# Patient Record
Sex: MALE | Race: WHITE | Employment: STUDENT
[De-identification: names, ages, dates, MRNs, and addresses within clinical notes are randomized per-mention and may not be internally consistent; named-entity substitution may affect disease eponyms.]

---
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
## 2021-01-04 ENCOUNTER — OFFICE VISIT (OUTPATIENT)
Dept: DERMATOLOGY | Age: 21
End: 2021-01-04
Payer: COMMERCIAL

## 2021-01-04 VITALS
SYSTOLIC BLOOD PRESSURE: 106 MMHG | BODY MASS INDEX: 23.14 KG/M2 | HEIGHT: 77 IN | DIASTOLIC BLOOD PRESSURE: 71 MMHG | TEMPERATURE: 98.2 F | HEART RATE: 87 BPM | OXYGEN SATURATION: 96 % | WEIGHT: 196 LBS

## 2021-01-04 DIAGNOSIS — L70.0 ACNE VULGARIS: Primary | ICD-10-CM

## 2021-01-04 DIAGNOSIS — L73.0 POST-ACNE SCARRING: ICD-10-CM

## 2021-01-04 PROCEDURE — 99213 OFFICE O/P EST LOW 20 MIN: CPT | Performed by: DERMATOLOGY

## 2021-01-04 RX ORDER — TRETINOIN 0.5 MG/G
CREAM TOPICAL
Qty: 45 G | Refills: 11 | Status: SHIPPED | OUTPATIENT
Start: 2021-01-04 | End: 2022-01-03 | Stop reason: SDUPTHER

## 2021-01-04 SDOH — HEALTH STABILITY: MENTAL HEALTH: HOW OFTEN DO YOU HAVE A DRINK CONTAINING ALCOHOL?: NEVER

## 2021-01-04 NOTE — PROGRESS NOTES
Dermatology Patient Note  [address]  [address]  [address] [place] [id number]  Dept: [phone]  Dept Fax: [phone]      VISITDATE: 1/4/2021   REFERRING PROVIDER: No ref. provider found      Carlton Bentley is a 24 y.o. male  who presents today in the office for:    Acne (Follow up: pt is off Accutane, he states that he is doing well. He will only use the tretinoin if needed. )      PERTINENT HISTORY NOT LISTED ABOVE:  Patient still has scarring but not very bothered by it currently  Will occasionally get new bumps if he goes a few days without washing face  Only using tretinoin as needed    CURRENT MEDICATIONS:   Current Outpatient Medications   Medication Sig Dispense Refill    tretinoin (RETIN-A) 0.05 % cream Apply a pea sized amount to face, chest and back nightly 45 g 11    albuterol sulfate HFA (VENTOLIN HFA) 108 (90 Base) MCG/ACT inhaler Inhale 2 puffs into the lungs every 4 hours as needed for Shortness of Breath 1 Inhaler 1     No current facility-administered medications for this visit. ALLERGIES:   No Known Allergies    SOCIAL HISTORY:  Social History     Tobacco Use    Smoking status: Never Smoker    Smokeless tobacco: Never Used   Substance Use Topics    Alcohol use: Never     Frequency: Never     Binge frequency: Never       Pertinent ROS:  Review of Systems  Skin: Denies any new changing, growing or bleeding lesions or rashes except as described in the HPI   Constitutional: Denies fevers, chills, and malaise.     PHYSICAL EXAM:   /71 (Site: Left Upper Arm, Position: Sitting, Cuff Size: Medium Adult)   Pulse 87   Temp 98.2 °F (36.8 °C) (Temporal)   Ht (!) 6' 5\" (1.956 m)   Wt 196 lb (88.9 kg)   SpO2 96%   BMI 23.24 kg/m²     The patient is generally well appearing, well nourished, alert and conversational. Affect is normal.    Cutaneous Exam:  Acne exam, which includes the head/face, neck, chest, and back was performed    Diagnoses/exam findings/medical history pertinent to this visit are listed below:    Assessment and Plan:  Assessment   1. Acne vulgaris  - stable chronic illness  - no active lesions today, but reports that he has had slight recurrences  - discussed using tretinoin daily as preventative for new acne bumps  - tretinoin (RETIN-A) 0.05 % cream; Apply a pea sized amount to face, chest and back nightly  Dispense: 45 g; Refill: 11    2. Post-acne scarring  - discussed referral to cosmetic therapy including CO2 laser, may be interested in the future but not currently            RTC 1 year    Patient Instructions   - Advise us if you'd like referral for laser therapy  - Continue topicals  - Follow up in 1 year      This note was created with the assistance of a speech-recognition program.  Although the intention is to generate a document that actually reflects the content of the visit, no guarantees can be provided that every mistake has been identified and corrected byediting.     Electronically signed by Jaron Frias MD on 1/4/21 at 11:21 AM EST

## 2021-03-05 ENCOUNTER — OFFICE VISIT (OUTPATIENT)
Dept: PEDIATRICS CLINIC | Age: 21
End: 2021-03-05
Payer: COMMERCIAL

## 2021-03-05 VITALS
WEIGHT: 191 LBS | BODY MASS INDEX: 22.55 KG/M2 | TEMPERATURE: 98.4 F | HEIGHT: 77 IN | SYSTOLIC BLOOD PRESSURE: 132 MMHG | HEART RATE: 75 BPM | DIASTOLIC BLOOD PRESSURE: 72 MMHG

## 2021-03-05 DIAGNOSIS — L98.9 SKIN LESION OF SCALP: Primary | ICD-10-CM

## 2021-03-05 DIAGNOSIS — L21.9 SEBORRHEIC DERMATITIS: ICD-10-CM

## 2021-03-05 PROCEDURE — 99213 OFFICE O/P EST LOW 20 MIN: CPT | Performed by: NURSE PRACTITIONER

## 2021-03-05 RX ORDER — KETOCONAZOLE 20 MG/ML
SHAMPOO TOPICAL
Qty: 1 BOTTLE | Refills: 2 | Status: SHIPPED | OUTPATIENT
Start: 2021-03-05

## 2021-03-05 ASSESSMENT — ENCOUNTER SYMPTOMS
COUGH: 0
SORE THROAT: 0
ABDOMINAL PAIN: 0
SWOLLEN GLANDS: 0

## 2021-03-05 NOTE — PROGRESS NOTES
Subjective:      Patient ID: Rosemary Joy is a 24 y.o. male who presents in office today for C/o soft spot on top of head. Patient states he noticed spot 2 weeks ago denies pain, headaches, activity change or appetite change. Chief Complaint   Patient presents with    Other       Onset: 2 weeks ago   Location: Top of head   Duration:   Characteristics: Soft   Associated symptoms: None   Relieving factors: None   Treatments: None     Other  This is a new problem. The current episode started more than 1 month ago. The problem occurs constantly. The problem has been unchanged. Pertinent negatives include no abdominal pain, anorexia, congestion, coughing, fatigue, fever, headaches, rash, sore throat, swollen glands or weakness. Nothing aggravates the symptoms. He has tried nothing for the symptoms. The treatment provided no relief. Review of Systems   Constitutional: Negative for activity change, appetite change, fatigue and fever. HENT: Negative for congestion and sore throat. Respiratory: Negative for cough. Gastrointestinal: Negative for abdominal pain and anorexia. Skin: Negative for rash. Neurological: Negative for dizziness, weakness and headaches. All other systems reviewed and are negative. Objective:   /72 (Site: Right Upper Arm, Position: Sitting, Cuff Size: Medium Adult)   Pulse 75   Temp 98.4 °F (36.9 °C) (Temporal)   Ht (!) 6' 5\" (1.956 m)   Wt 191 lb (86.6 kg)   BMI 22.65 kg/m²   Physical Exam  Vitals signs and nursing note reviewed. Constitutional:       General: He is not in acute distress. Appearance: Normal appearance. He is well-developed. HENT:      Head: Normocephalic. Eyes:      General:         Right eye: No discharge. Left eye: No discharge. Conjunctiva/sclera: Conjunctivae normal.   Neck:      Musculoskeletal: Neck supple. Thyroid: No thyromegaly. Cardiovascular:      Rate and Rhythm: Normal rate and regular rhythm. Heart sounds: Normal heart sounds. No murmur. Pulmonary:      Effort: Pulmonary effort is normal. No respiratory distress. Breath sounds: Normal breath sounds. No wheezing or rales. Lymphadenopathy:      Cervical: No cervical adenopathy. Skin:     General: Skin is warm and dry. Comments: Top of scalp hear crown of head with approximately [id number] palpable area that is soft, boggy. There is no change to the skin pigment of the overlying area, no obvious lesions except for seborrheic dermatitis that is noted    Neurological:      Mental Status: He is alert. Psychiatric:         Behavior: Behavior normal.           Assessment/Plan:           Diagnosis Orders   1. Skin lesion of scalp     2. Seborrheic dermatitis  ketoconazole (NIZORAL) 2 % shampoo       Lesion of scalp: Area of soft, boggy swelling to crown of head, noted months ago without change, unsure of etiology but doubt anything concerning. I would like for him to see dermatologist, he follows with Dr. Barney Golden for acne. He also has seborrheic dermatitis, so will treat with ketoconazole shampoo. Call with concerns    Results for orders placed or performed during the hospital encounter of 09/06/19   Hepatic Function Panel   Result Value Ref Range    Albumin 4.8 3.5 - 5.2 g/dL    Alkaline Phosphatase 111 40 - 129 U/L    ALT 11 5 - 41 U/L    AST 15 <40 U/L    Total Bilirubin 0.60 0.3 - 1.2 mg/dL    Bilirubin, Direct 0.12 <0.31 mg/dL    Bilirubin, Indirect 0.48 0.00 - 1.00 mg/dL    Total Protein 8.1 6.4 - 8.3 g/dL    Globulin NOT REPORTED 1.5 - 3.8 g/dL    Albumin/Globulin Ratio 1.5 1.0 - 2.5   Lipid Panel   Result Value Ref Range    Cholesterol 173 <200 mg/dL    HDL 37 (L) >40 mg/dL    LDL Cholesterol 122 0 - 130 mg/dL    Chol/HDL Ratio 4.7 <5    Triglycerides 71 <150 mg/dL    VLDL NOT REPORTED 1 - 30 mg/dL       Return if symptoms worsen or fail to improve.     I have reviewed and agree with documentation per clinical staff, and have made any necessaryadjustments.   Electronically signed by LUIS Perez CNP on 3/5/2021 at 10:34 AM Please note that portions of this note were completed with a voice recognition program. Efforts weremade to edit the dictations but occasionally words are mis-transcribed.)

## 2021-05-28 ENCOUNTER — TELEPHONE (OUTPATIENT)
Dept: PEDIATRICS CLINIC | Age: 21
End: 2021-05-28

## 2021-05-28 ENCOUNTER — TELEMEDICINE (OUTPATIENT)
Dept: PEDIATRICS CLINIC | Age: 21
End: 2021-05-28
Payer: COMMERCIAL

## 2021-05-28 ENCOUNTER — PATIENT MESSAGE (OUTPATIENT)
Dept: PEDIATRICS CLINIC | Age: 21
End: 2021-05-28

## 2021-05-28 DIAGNOSIS — L05.91 PILONIDAL CYST: Primary | ICD-10-CM

## 2021-05-28 PROCEDURE — 99213 OFFICE O/P EST LOW 20 MIN: CPT | Performed by: NURSE PRACTITIONER

## 2021-05-28 ASSESSMENT — ENCOUNTER SYMPTOMS
ABDOMINAL PAIN: 0
VOMITING: 0

## 2021-05-28 NOTE — PATIENT INSTRUCTIONS
[address] [address], [place], [place]   [address] [place] [address],  [address], [place] [address] [place] [address], [facility] [address]   [phone]

## 2021-05-28 NOTE — PROGRESS NOTES
Subjective:      Patient ID: Ifeanyi Castillo is a 24 y.o. male   2021    TELEHEALTH EVALUATION -- Audio/Visual (During UMR-50 public health emergency)    Ifeanyi Castillo (:  2000) has requested a video evaluation for the following concern(s): pilonidal cyst    Patient presents today with chief complaint of pilonidal cyst.  He has had a history of this, and followed with pediatric surgeon Dr. Fina Sim in 2018. It was surgically removed in 2018. He reports since then he has not had any issues with pain or drainage. Over the past 2 weeks, he reports some tenderness to the sacral area, gluteal cleft, but did not appreciate any obvious redness, swelling, drainage. This morning he noted that the cyst had returned, and it was now open with purulent drainage. Other  This is a recurrent problem. The current episode started more than 1 year ago. The problem occurs constantly. The problem has been unchanged. Pertinent negatives include no abdominal pain, fatigue, fever or vomiting. Nothing aggravates the symptoms. He has tried nothing for the symptoms. The treatment provided no relief. Review of Systems   Constitutional: Negative for activity change, appetite change, fatigue and fever. Gastrointestinal: Negative for abdominal pain and vomiting. Skin: Positive for wound. Psychiatric/Behavioral: Negative for sleep disturbance. Prior to Visit Medications    Medication Sig Taking? Authorizing Provider   ketoconazole (NIZORAL) 2 % shampoo Shampoo hair 3 times per week. Apply shampoo to wet hair, allow to stand for a few minutes, then rinse well.  Avoid contact with eyes  LUIS Velasco - CNP   tretinoin (RETIN-A) 0.05 % cream Apply a pea sized amount to face, chest and back nightly  Patient not taking: Reported on 3/5/2021  Mal Goltz, MD   albuterol sulfate HFA (VENTOLIN HFA) 108 (90 Base) MCG/ACT inhaler Inhale 2 puffs into the lungs every 4 hours as needed for Shortness of Breath  Patient not taking: Reported on 3/5/2021  Ellen Arrieta, APRN - CNP       Social History     Tobacco Use    Smoking status: Never Smoker    Smokeless tobacco: Never Used   Vaping Use    Vaping Use: Never used   Substance Use Topics    Alcohol use: Never    Drug use: Never        Objective:      Physical Exam  Vitals and nursing note reviewed. Constitutional:       Appearance: Normal appearance. He is normal weight. He is not ill-appearing or toxic-appearing. HENT:      Head: Normocephalic. Nose: Nose normal. No rhinorrhea. Mouth/Throat:      Mouth: Mucous membranes are moist.   Eyes:      General:         Right eye: No discharge. Left eye: No discharge. Conjunctiva/sclera: Conjunctivae normal.   Pulmonary:      Effort: Pulmonary effort is normal. No respiratory distress. Musculoskeletal:      Cervical back: Normal range of motion. Neurological:      Mental Status: He is alert. Psychiatric:         Mood and Affect: Mood normal.         Behavior: Behavior normal.         Assessment/Plan:           Diagnosis Orders   1. Pilonidal cyst  Nicole Garces MD, Pediatric Surgery, [place]       Pilonidal cyst: History of pilonidal cyst that was surgically excised in 12/2018. He has had some tenderness for the past few weeks and purulent drainage began this morning. Will refer back to peds surgery, recommend to keep area covered with gauze while draining, change this as needed. Cleanse area 2 times daily with antibacterial soap, and rinse well with sterile saline. Call if worsening, increased pain or fevers develop.       Orders Placed This Encounter   Procedures   Nicole Garces MD, Pediatric Surgery, [place]     Referral Priority:   Routine     Referral Type:   Eval and Treat     Referral Reason:   Specialty Services Required     Referred to Provider:   Gerald Mckeon MD     Requested Specialty:   Pediatric Surgery     Number of Visits Requested: 1       Results for orders placed or performed during the hospital encounter of 09/06/19   Hepatic Function Panel   Result Value Ref Range    Albumin 4.8 3.5 - 5.2 g/dL    Alkaline Phosphatase 111 40 - 129 U/L    ALT 11 5 - 41 U/L    AST 15 <40 U/L    Total Bilirubin 0.60 0.3 - 1.2 mg/dL    Bilirubin, Direct 0.12 <0.31 mg/dL    Bilirubin, Indirect 0.48 0.00 - 1.00 mg/dL    Total Protein 8.1 6.4 - 8.3 g/dL    Globulin NOT REPORTED 1.5 - 3.8 g/dL    Albumin/Globulin Ratio 1.5 1.0 - 2.5   Lipid Panel   Result Value Ref Range    Cholesterol 173 <200 mg/dL    HDL 37 (L) >40 mg/dL    LDL Cholesterol 122 0 - 130 mg/dL    Chol/HDL Ratio 4.7 <5    Triglycerides 71 <150 mg/dL    VLDL NOT REPORTED 1 - 30 mg/dL       Return if symptoms worsen or fail to improve. Brandon Deng is a 24 y.o. male being evaluated by a Virtual Visit (video visit) encounter to address concerns as mentioned above. A caregiver was present when appropriate. Due to this being a TeleHealth encounter (During PQGFZ-24 public health emergency), evaluation of the following organ systems was limited: Vitals/Constitutional/EENT/Resp/CV/GI//MS/Neuro/Skin/Heme-Lymph-Imm. Pursuant to the emergency declaration under the [address] [place] [address], [address] [place] [address] and the [employer] Act, this Virtual Visit was conducted with patient's (and/or legal guardian's) consent, to reduce the patient's risk of exposure to COVID-19 and provide necessary medical care. The patient (and/or legal guardian) has also been advised to contact this office for worsening conditions or problems, and seek emergency medical treatment and/or call 911 if deemed necessary. Services were provided through a video synchronous discussion virtually to substitute for in-person clinic visit. Patient and provider were located at their individual homes.     I spent a total of 23 minutes on the date of the service which included preparing to see the patient, face-to-face patient care, completing clinical documentation and counseling and educating the patient/family/caregiver regarding pilonidal cyst.    I have reviewed and agree with documentation per clinical staff, and have made any necessaryadjustments. Electronically signed by LUIS Patel CNP on 5/28/2021 at 10:50 AM Please note that portions of this note were completed with a voice recognition program. Efforts weremade to edit the dictations but occasionally words are mis-transcribed.

## 2021-05-28 NOTE — TELEPHONE ENCOUNTER
From: Noah Mcwilliams  To: Tavon Kalani, APRN - CNP  Sent: 5/28/2021 1:19 PM EDT  Subject: Visit Follow-Up Question    Hello! When I called Dr. Sujatha Ribeiro office to make an appt they said he wouldn't see Gaye Galeazzi because he's 24 now and Dr. German Campos is a pediatric surgeon. I didn't know if you had someone else that you can recommend for us or refer us to? If not, I was looking at [address] [place] [address] (since we have [employer] insurance, it's considered under Tier 1 coverage, so apparently a little bit of a discount or more coverage or something) and there is a physician there that is Board Certified for General Surgery & Colon-Rectal Surgery. I didn't know if that would be someone that you could refer us to see if you didn't have anyone else? Just let me know what we should do next. Thank you!  Addi Escoto

## 2021-06-07 ENCOUNTER — OFFICE VISIT (OUTPATIENT)
Dept: SURGERY | Age: 21
End: 2021-06-07
Payer: COMMERCIAL

## 2021-06-07 VITALS
HEIGHT: 77 IN | DIASTOLIC BLOOD PRESSURE: 63 MMHG | WEIGHT: 188 LBS | HEART RATE: 78 BPM | SYSTOLIC BLOOD PRESSURE: 115 MMHG | BODY MASS INDEX: 22.2 KG/M2 | OXYGEN SATURATION: 98 %

## 2021-06-07 DIAGNOSIS — L05.91 PILONIDAL CYST: Primary | ICD-10-CM

## 2021-06-07 PROCEDURE — 99203 OFFICE O/P NEW LOW 30 MIN: CPT | Performed by: SURGERY

## 2021-06-08 ENCOUNTER — TELEPHONE (OUTPATIENT)
Dept: SURGERY | Age: 21
End: 2021-06-08

## 2021-06-08 NOTE — PROGRESS NOTES
HFA) 108 (90 Base) MCG/ACT inhaler Inhale 2 puffs into the lungs every 4 hours as needed for Shortness of Breath 1 Inhaler 1     No current facility-administered medications for this visit. Home Medications:   Prior to Admission medications    Medication Sig Start Date End Date Taking? Authorizing Provider   ketoconazole (NIZORAL) 2 % shampoo Shampoo hair 3 times per week. Apply shampoo to wet hair, allow to stand for a few minutes, then rinse well. Avoid contact with eyes 3/5/21  Yes LUIS Pitts CNP   tretinoin (RETIN-A) 0.05 % cream Apply a pea sized amount to face, chest and back nightly 1/4/21  Yes Jamarcus Jackson MD   albuterol sulfate HFA (VENTOLIN HFA) 108 (90 Base) MCG/ACT inhaler Inhale 2 puffs into the lungs every 4 hours as needed for Shortness of Breath 12/11/20  Yes LUIS Pitts CNP       Allergies  Patient has no known allergies. Review of Systems:  General: Denies any fever, chills. Eyes: Denies any changes in vision, diplopia or eye pain  Ears, Nose, Mouth: Denies changes in hearing/tinnitus or drainage from ears, no rhinorrhea or bloody nose, no difficulty chewing  Throat: no difficulty swallowing, no throat pain  Respiratory: Denies any shortness of breath or cough. Cardiac: Denies any chest pain, palpitations, claudication or edema. Gastrointestinal:  Denies any melena, hematochezia, hematemesis or pyrosis. Genitourinary: Denies any frequency, urgency, hesitancy or incontinence. Musculoskeletal: Denies worsening muscle weakness or recent trauma  Skin: pilonidal cyst  Psychiatric: Denies any recent changes in mood or affect  Hematologic: Denies bruising or bleeding easily.     PHYSICAL EXAMINATION  Vitals:   Vitals:    06/07/21 1045   BP: 115/63   Pulse: 78   SpO2: 98%       General Appearance:  awake, alert, no acute distress, well developed, well nourished   Skin:  Sinus of the midline intergluteal cleft consistent with pilonidal disease, the defect is ~5mm in diameter, demonstrates some granulation tissue with new scar formation of the periphery at the skin level, no evidence of infection, nontender, no fluid/debris able to be expressed. Head/face:  NCAT, face symmetrical  Eyes:  PERRL, no evidence of conjunctivitis or ptosis bilaterally  Ears:  External ears and canals grossly normal, no evidence of otorrhea. Nose/Sinuses:  Nares normal. Septum midline. Mucosa normal. No external drainage noted. Mouth/Neck:  Mucosa moist.  No external oral lesions. Trachea midline. No visible masses. Lungs:  Normal chest expansion, unlabored breathing without accessory muscle use. No audible rales, rhonchi, or wheezing. Cardiovascular: S1S2. No evidence of JVD. No evidence of pulsatile masses in abdomen  Abdomen:  Soft, non-tender, no organomegaly, no masses. Musculoskeletal: No evidence of bony/muscular deformities, trauma, atrophy of either left/right upper/lower extremity. No evidence of digital clubbing or cyanosis. Neurologic:  CN 2-12 grossly intact without obvious deficits. Grossly normal sensation in all extremities. Psychiatric: appropriate judgement and insight, appropriate recall of recent and remote memory, no evidence of depression/anxiety/agitation      DIAGNOSES:   Diagnosis Orders   1. Pilonidal cyst         PLAN:  We discussed several treatment options for this problem:  Laser hair removal to reduce risk of new sinus creation, pt's mother reports that they had previously looked into this and are ready to proceed with hair removal treatments. Washing that area diligently at least twice daily with soap and water  Pressure offloading with improved sitting posture  Surgical options include limited resection of the intergluteal cleft vs extensive resection and flap creation, either of which would require at least two weeks of limited hip flexion in order to avoid tension at the incision site.  Patient and mother would like some time to think about surgical intervention, they will contact us when they are ready to proceed with surgery  All questions were answered, pt and mother are agreeable to this plan.       ·       Medical Decision Making: low complexity     Electronically signed by Álvaro Pearce DO on 6/7/2021 at 8:33 PM

## 2021-06-08 NOTE — TELEPHONE ENCOUNTER
Patient mother, Brett Tompkins, called today to schedule surgery. Advised her that Dr. Messi Lane does not take [employer] insurance and that I could not schedule as this would be an out of pocket surgery. She advised that she will call the pediatrician back and be referred to someone else.

## 2021-08-30 DIAGNOSIS — J45.990 EXERCISE INDUCED BRONCHOSPASM: ICD-10-CM

## 2021-08-30 RX ORDER — ALBUTEROL SULFATE 90 UG/1
AEROSOL, METERED RESPIRATORY (INHALATION)
Qty: 8.5 G | Refills: 1 | Status: SHIPPED | OUTPATIENT
Start: 2021-08-30

## 2021-11-19 ENCOUNTER — OFFICE VISIT (OUTPATIENT)
Dept: PEDIATRICS CLINIC | Age: 21
End: 2021-11-19
Payer: COMMERCIAL

## 2021-11-19 VITALS
SYSTOLIC BLOOD PRESSURE: 117 MMHG | TEMPERATURE: 98.1 F | DIASTOLIC BLOOD PRESSURE: 75 MMHG | HEART RATE: 79 BPM | WEIGHT: 186.6 LBS | HEIGHT: 76 IN | BODY MASS INDEX: 22.72 KG/M2

## 2021-11-19 DIAGNOSIS — Z00.129 HEALTH CHECK FOR CHILD OVER 28 DAYS OLD: Primary | ICD-10-CM

## 2021-11-19 DIAGNOSIS — Z97.3 WEARS GLASSES: ICD-10-CM

## 2021-11-19 DIAGNOSIS — L05.91 PILONIDAL CYST: ICD-10-CM

## 2021-11-19 PROBLEM — J03.90 TONSILLITIS: Status: RESOLVED | Noted: 2017-02-10 | Resolved: 2021-11-19

## 2021-11-19 PROCEDURE — 99395 PREV VISIT EST AGE 18-39: CPT | Performed by: NURSE PRACTITIONER

## 2021-11-19 ASSESSMENT — PATIENT HEALTH QUESTIONNAIRE - PHQ9
1. LITTLE INTEREST OR PLEASURE IN DOING THINGS: 0
SUM OF ALL RESPONSES TO PHQ QUESTIONS 1-9: 0
SUM OF ALL RESPONSES TO PHQ QUESTIONS 1-9: 0
2. FEELING DOWN, DEPRESSED OR HOPELESS: 0
SUM OF ALL RESPONSES TO PHQ9 QUESTIONS 1 & 2: 0
SUM OF ALL RESPONSES TO PHQ QUESTIONS 1-9: 0

## 2021-11-19 NOTE — PROGRESS NOTES
16+ WELL CHILD VISIT    Mable Grubbs is a 22 y.o. male here for well child exam with patient    CURRENT PATIENT/PARENT CONCERNS    No concerns voiced    Forms?: no  School/work notes?:no  Refills?:no    Vision Screen  Pt wears glasses    Depression Screen  PHQ-9 Total: 0    REVIEW OF LIFESTYLE  Who does the adolescent live with?: mom and dad  Has working smoke alarms at home?:  Yes  Carbon monoxide detectors in home?: Yes  Guns/weapons in the home?: yes, locked away    Wears a seat belt in car?: Yes  Texting while driving? No  Drivers License? Yes  Wears sunscreen?: Yes  Sees the dentist regularly?: Yes    SCHOOL  Grade in school?: good grades  School Performance/GPA?: 2.5  Bullying others or being bullied at school?: No  Problems falling asleep or staying asleep, or snoring: no    DIET    Amount of sugary beverages (including pop and sports drinks with sugar per day?:  20 oz per day  Caffeine or Energy drinks? 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 Sleeping normally. Eyes:  Denies eye drainage or redness, no concerns for vision. HENT:  Denies nasal congestion or ear drainage, no concerns for hearing. Respiratory:  Denies cough or troubles breathing. Cardiovascular:  Denies chest pain, palpitations, lightheadedness, dizziness, headaches with exercise.    GI:  Denies vomiting, bloody stools, constipation, or diarrhea. Adolescent has good appetite  :  Denies changes in urination. No blood noted. No dysuria, no discharge. Menses not applicable   Musculoskeletal:  Denies joint redness or swelling. Normal movement of extremities. Integument:  Denies rash or acne  Neurologic:  Denies focal weakness, no altered level of consciousness  Endocrine:  Denies polyuria, development of secondary sex characteristics yes  Lymphatic:  Denies swollen glands or edema. Psychosocial: Denies mood or depressive symptoms. Sexually active not sexually active. Recreational drug use denies all    PHYSICAL EXAM    Vital Signs:  /75 (Site: Left Upper Arm, Position: Sitting, Cuff Size: Large Adult)   Pulse 79   Temp 98.1 °F (36.7 °C) (Infrared)   Ht 6' 4.5\" (1.943 m)   Wt 186 lb 9.6 oz (84.6 kg)   BMI 22.42 kg/m²  Facility age limit for growth percentiles is 20 years. Facility age limit for growth percentiles is 20 years. Facility age limit for growth percentiles is 20 years. General:  Alert, interactive and appropriate, well nourished and well-appearing  Head:  Normocephalic, atraumatic. Eyes:  No drainage. Conjunctiva clear. Bilateral red reflex present. EOMs intact, PERRLA  Ears:  External ears normal, TM's normal.  Nose:  Nares normal, no drainage  Mouth:  Oropharynx normal, pink moist mucous membranes, skin intact, no lesions. Teeth/gums intact without abscess or caries  Neck:  Symmetric, supple, full range of motion, no tenderness, no masses, thyroid normal.  Chest:  Symmetrical  Respiratory:  Breathing not labored. Normal respiratory rate. Chest clear to auscultation. Heart:  Regular rate and rhythm, normal S1 and S2, femoral pulses full and symmetric. Brisk cap refill  Murmur:  no murmur noted  Abdomen:  Soft, nontender, nondistended, normal bowel sounds, no hepatosplenomegaly or abnormal masses.   Genitals:  genitalia not examined  Lymphatic:  No cervical, inguinal, or axillary adenopathy. Musculoskeletal:  Spine straight without scoliosis. Normal posture. Steady gait. Hips with normal and symmetric range of motion. Leg length symmetric. Skin:  No rashes, lesions, indurations, or cyanosis. Pink. Neuro:  Normal tone and movement bilaterally. CN 2-12 intact     Psychosocial: Parents interact well with adolescent, interested, asking appropriate questions      IMMUNES  Immunization History   Administered Date(s) Administered    COVID-19, Amairani April, Primary or Immunocompromised, PF, 100mcg/0.5mL 04/01/2021, 04/29/2021    DTaP 2001, 2001, 01/15/2002, 01/09/2003, 06/08/2006    HPV 9-valent Marla Walthall) 07/26/2019, 09/09/2019, 02/10/2020    Hepatitis A Ped/Adol (Havrix, Vaqta) 09/09/2019, 11/11/2020    Hepatitis B (Engerix-B) 2001, 2001, 02/14/2002    Hib PRP-OMP (PedvaxHIB) 2001, 2001, 01/15/2002, 01/09/2003    Influenza A (M1Z6-63) Vaccine PF IM 12/06/2009    Influenza Whole 12/16/2003    Influenza, Quadv, IM, PF (6 mo and older Fluzone, Flulaval, Fluarix, and 3 yrs and older Afluria) 09/09/2019    MMR 10/07/2002, 06/08/2006    Meningococcal B, OMV (Bexsero) 07/26/2019, 09/09/2019    Meningococcal MCV4P (Menactra) 01/25/2016, 07/26/2019    Pneumococcal Conjugate 13-valent (Michi Leaks) 2001, 2001, 2001, 07/02/2002    Polio IPV (IPOL) 2001, 2001, 06/08/2006    Tdap (Boostrix, Adacel) 08/02/2013    Varicella (Varivax) 07/02/2002, 01/25/2016       IMPRESSION/PLAN  1. Health check for child over 34 days old    2. Pilonidal cyst    3.  Wears glasses        Healthy 22year old    Acne vulgaris: Significant improvement, completed treatment with Accutane in 2019 and is on topicals, follow up with derm as scheduled in 1/2022     Wears glasses: Continue appropriate follow up with eye doctor    Pilonidal cyst: Follows with surgeon Dr. Monty Rosales, continues to recur, continue appropriate follow up        Next well child visit per routine in 1 year  Anticipatory guidance discussed or covered in handout given to family:   Helmet for bikes, skateboards, etc.   Limit screen time to < 2 hours daily   Healthy eating habits   Adequate exercise   Discipline   Drugs   Puberty   Secondary Sex Characteristics   Safe sex   No texting while driving   Seatbelt    -Obtain routine (non-fasting) lipid panel screening at 2521 years of age  -Consider fasting lipid panel based on family history, weight, and exam  -If BMI>85% with 2 risk factors (hypertension, acanthosis nigricans, family history of type 2 DM, high risk ethnicity) then consider fasting and post-prandial glucose/insulin level, ALT, AST  -Consider HGB screen for menstruating females and other at risk populations  -Consider STI screening for sexually active adolescents    Orders Placed This Encounter   Procedures    Lipid Panel     Standing Status:   Future     Standing Expiration Date:   11/19/2022     Order Specific Question:   Is Patient Fasting?/# of Hours     Answer:   8-12    Hepatitis C Antibody     Standing Status:   Future     Standing Expiration Date:   11/19/2022    HIV-1 And HIV-2 Antibodies     Standing Status:   Future     Standing Expiration Date:   11/19/2022     Results for orders placed or performed in visit on 06/18/21   POC Glucose Fingerstick   Result Value Ref Range    POC Glucose 99 70 - 100 mg/dL     Return in about 1 year (around 11/19/2022) for well child exam.    I have reviewed and agree with documentation per clinical staff, and have made any necessary adjustments.   Electronically signed by LUIS Bright CNP on 11/19/2021 at 9:43 AM (Please note that portions of this note were completed with a voice recognition program. Efforts were made to edit the dictations, but occasionally words are mis-transcribed.)

## 2021-11-19 NOTE — PATIENT INSTRUCTIONS
Patient Education        Well Visit, Ages 25 to 48: Care Instructions  Overview     Well visits can help you stay healthy. Your doctor has checked your overall health and may have suggested ways to take good care of yourself. Your doctor also may have recommended tests. At home, you can help prevent illness with healthy eating, regular exercise, and other steps. Follow-up care is a key part of your treatment and safety. Be sure to make and go to all appointments, and call your doctor if you are having problems. It's also a good idea to know your test results and keep a list of the medicines you take. How can you care for yourself at home? · Get screening tests that you and your doctor decide on. Screening helps find diseases before any symptoms appear. · Eat healthy foods. Choose fruits, vegetables, whole grains, protein, and low-fat dairy foods. Limit fat, especially saturated fat. Reduce salt in your diet. · Limit alcohol. If you are a man, have no more than 2 drinks a day or 14 drinks a week. If you are a woman, have no more than 1 drink a day or 7 drinks a week. · Get at least 30 minutes of physical activity on most days of the week. Walking is a good choice. You also may want to do other activities, such as running, swimming, cycling, or playing tennis or team sports. Discuss any changes in your exercise program with your doctor. · Reach and stay at a healthy weight. This will lower your risk for many problems, such as obesity, diabetes, heart disease, and high blood pressure. · Do not smoke or allow others to smoke around you. If you need help quitting, talk to your doctor about stop-smoking programs and medicines. These can increase your chances of quitting for good. · Care for your mental health. It is easy to get weighed down by worry and stress. Learn strategies to manage stress, like deep breathing and mindfulness, and stay connected with your family and community.  If you find you often feel sad or hopeless, talk with your doctor. Treatment can help. · Talk to your doctor about whether you have any risk factors for sexually transmitted infections (STIs). You can help prevent STIs if you wait to have sex with a new partner (or partners) until you've each been tested for STIs. It also helps if you use condoms (male or female condoms) and if you limit your sex partners to one person who only has sex with you. Vaccines are available for some STIs, such as HPV. · Use birth control if it's important to you to prevent pregnancy. Talk with your doctor about the choices available and what might be best for you. · If you think you may have a problem with alcohol or drug use, talk to your doctor. This includes prescription medicines (such as amphetamines and opioids) and illegal drugs (such as cocaine and methamphetamine). Your doctor can help you figure out what type of treatment is best for you. · Protect your skin from too much sun. When you're outdoors from 10 a.m. to 4 p.m., stay in the shade or cover up with clothing and a hat with a wide brim. Wear sunglasses that block UV rays. Even when it's cloudy, put broad-spectrum sunscreen (SPF 30 or higher) on any exposed skin. · See a dentist one or two times a year for checkups and to have your teeth cleaned. · Wear a seat belt in the car. When should you call for help? Watch closely for changes in your health, and be sure to contact your doctor if you have any problems or symptoms that concern you. Where can you learn more? Go to [email or web address].[email or web address]. org and sign in to your [employer] account. Enter P072 in the GlobeIn box to learn more about \"Well Visit, Ages 25 to 48: Care Instructions. \"     If you do not have an account, please click on the \"Sign Up Now\" link. Current as of: February 11, 2021               Content Version: 13.0  © 1190-8310 Healthwise, Incorporated.    Care instructions adapted under license by [facility]. If you have questions about a medical condition or this instruction, always ask your healthcare professional. [place] [address] any warranty or liability for your use of this information.

## 2021-12-29 LAB
CHOLESTEROL/HDL RATIO: 4.6 (ref 0–4.5)
CHOLESTEROL: 176 MG/DL (ref 120–200)
HDLC SERPL-MCNC: 38 MG/DL (ref 23–92)
HEPATITIS C ANTIBODY: NONREACTIVE
HIV COMBO: NEGATIVE
LDL CHOLESTEROL: 110 MG/DL (ref 0–130)
NON-HDL CHOLESTEROL: 138 MG/DL
TRIGL SERPL-MCNC: 140 MG/DL (ref 40–149)
VLDLC SERPL CALC-MCNC: 28 MG/DL (ref 0–40)

## 2022-01-03 ENCOUNTER — OFFICE VISIT (OUTPATIENT)
Dept: DERMATOLOGY | Age: 22
End: 2022-01-03
Payer: COMMERCIAL

## 2022-01-03 VITALS
TEMPERATURE: 98.6 F | DIASTOLIC BLOOD PRESSURE: 72 MMHG | BODY MASS INDEX: 22.72 KG/M2 | HEIGHT: 77 IN | OXYGEN SATURATION: 97 % | HEART RATE: 79 BPM | WEIGHT: 192.4 LBS | SYSTOLIC BLOOD PRESSURE: 123 MMHG

## 2022-01-03 DIAGNOSIS — L70.0 ACNE VULGARIS: Primary | ICD-10-CM

## 2022-01-03 DIAGNOSIS — L73.0 POST-ACNE SCARRING: ICD-10-CM

## 2022-01-03 DIAGNOSIS — R22.9 SUBCUTANEOUS MASS: ICD-10-CM

## 2022-01-03 PROCEDURE — 99213 OFFICE O/P EST LOW 20 MIN: CPT | Performed by: DERMATOLOGY

## 2022-01-03 RX ORDER — TRETINOIN 0.5 MG/G
CREAM TOPICAL
Qty: 45 G | Refills: 11 | Status: SHIPPED | OUTPATIENT
Start: 2022-01-03

## 2022-01-03 NOTE — PROGRESS NOTES
Dermatology Patient Note  [address]  [facility]  Dept: [phone]  Dept Fax: [phone]      VISITDATE: 1/3/2022   REFERRING PROVIDER: No ref. provider found      Leslie Blankenship is a 22 y.o. male  who presents today in the office for:    Follow-up (1 yr acne- tretinoin use occasionally. No outbreaks. Get occasional pimple that resolves in a day or two.)      HISTORY OF PRESENT ILLNESS:  As above. He uses tretinoin daily unless he has a breakout, then he stops. He would also like his scalp examined because there is a \"squishy\" area. It does not bother him and it has not worsened. MEDICAL PROBLEMS:  Patient Active Problem List    Diagnosis Date Noted    Pilonidal cyst 11/19/2021    Stretch marks 05/13/2017    Family history of heart attack 01/25/2016    Acne 01/25/2016    Wears glasses 01/25/2016       CURRENT MEDICATIONS:   Current Outpatient Medications   Medication Sig Dispense Refill    tretinoin (RETIN-A) 0.05 % cream Apply a pea sized amount to face, chest and back nightly 45 g 11    albuterol sulfate  (90 Base) MCG/ACT inhaler INHALE 2 PUFFS BY MOUTH EVERY 4 HOURS AS NEEDED FOR SHORTNESS OF BREATH 8.5 g 1    ketoconazole (NIZORAL) 2 % shampoo Shampoo hair 3 times per week. Apply shampoo to wet hair, allow to stand for a few minutes, then rinse well. Avoid contact with eyes 1 Bottle 2     No current facility-administered medications for this visit. ALLERGIES:   No Known Allergies    SOCIAL HISTORY:  Social History     Tobacco Use    Smoking status: Never Smoker    Smokeless tobacco: Never Used   Substance Use Topics    Alcohol use: Never       Pertinent ROS:  Review of Systems  Skin: Denies any new changing, growing or bleeding lesions or rashes except as described in the HPI   Constitutional: Denies fevers, chills, and malaise.     PHYSICAL EXAM:   /72   Pulse 79 Temp 98.6 °F (37 °C)   Ht 6' 4.5\" (1.943 m)   Wt 192 lb 6.4 oz (87.3 kg)   SpO2 97%   BMI 23.11 kg/m²     The patient is generally well appearing, well nourished, alert and conversational. Affect is normal.    Cutaneous Exam:  Physical Exam  Acne exam: exam of face, neck, chest, and back was performed. Facial covering was removed during examination. Diagnoses/exam findings/medical history pertinent to this visit are listed below:    Assessment:   Diagnosis Orders   1. Acne vulgaris  tretinoin (RETIN-A) 0.05 % cream   2. Post-acne scarring     3. Subcutaneous mass  US HEAD NECK SOFT TISSUE THYROID        Plan:  Acne vulgaris   - stable chronic illness, rare breakouts s/p isotretinoin  - continue tretinoin daily     Post acne scarring  - may refer to cosmetic dermatologist in future if wanted     Boggy SC mass of scalp, possible lipoma vs. other  - ultrasound ordered     RTC 1 year    Future Appointments   Date Time Provider Romulo Vanessa   1/4/2023  9:15 AM Alina Lux MD  derm [facility]         There are no Patient Instructions on file for this visit. This note was created with the assistance of a speech-recognition program.  Although the intention is to generate a document that actually reflects the content of the visit, no guarantees can be provided that every mistake has been identified and corrected by editing. I, Dr. Lalit Magallon, personally performed the services described in this documentation, as scribed by [facility] in my presence, and it is both accurate and complete.      Electronically signed by Alina Lux MD on 1/3/22 at 10:25 AM EST

## 2022-12-21 NOTE — PROGRESS NOTES
Dermatology Patient Note  [address] [facility]  [address] [facility] [address]  Dept: [phone]  Dept Fax: [phone]      VISITDATE: 1/4/2023   REFERRING PROVIDER: No ref. provider found      Zoe Eastman is a 25 y.o. male  who presents today in the office for:    Acne (Acne is doing well. Minimal breakouts, pt using tretinoin cream and is happy with the progress he has made so far. PT is concerned about the long term scarring and PIH he has from his acne and would like to discuss options for this.)      HISTORY OF PRESENT ILLNESS:  Patient presents for 1 year acne follow up. Prescribed tretinoin. At last visit patient was found to have boggy SC mass of scalp, US was ordered. Today patient reports that he occasionally gets a few pimples but his acne has improved significantly. He reports that he gets breakouts on his back as well. Patient reports he had the [address] done 12/29 at [facility]. Mass is not bothering him. MEDICAL PROBLEMS:  Patient Active Problem List    Diagnosis Date Noted    Pilonidal cyst 11/19/2021    Stretch marks 05/13/2017    Family history of heart attack 01/25/2016    Acne 01/25/2016    Wears glasses 01/25/2016       CURRENT MEDICATIONS:   Current Outpatient Medications   Medication Sig Dispense Refill    benzoyl peroxide 5 % external liquid Wash affected areas once daily 227 g 3    clindamycin (CLEOCIN T) 1 % lotion Apply to affected areas daily 60 mL 3    tretinoin (RETIN-A) 0.05 % cream Apply a pea sized amount to face, chest and back nightly 45 g 11    albuterol sulfate  (90 Base) MCG/ACT inhaler INHALE 2 PUFFS BY MOUTH EVERY 4 HOURS AS NEEDED FOR SHORTNESS OF BREATH 8.5 g 1    ketoconazole (NIZORAL) 2 % shampoo Shampoo hair 3 times per week. Apply shampoo to wet hair, allow to stand for a few minutes, then rinse well.  Avoid contact with eyes (Patient not taking: Reported on 1/4/2023) 1 Bottle 2     No current facility-administered medications for this visit. ALLERGIES:   No Known Allergies    SOCIAL HISTORY:  Social History     Tobacco Use    Smoking status: Never    Smokeless tobacco: Never   Substance Use Topics    Alcohol use: Never       Pertinent ROS:  Review of Systems  Skin: Denies any new changing, growing or bleeding lesions or rashes except as described in the HPI   Constitutional: Denies fevers, chills, and malaise. PHYSICAL EXAM:   /74   Pulse 73   Temp 97.6 °F (36.4 °C)   Ht 6' 5\" (1.956 m)   Wt 195 lb (88.5 kg)   SpO2 95%   BMI 23.12 kg/m²     The patient is generally well appearing, well nourished, alert and conversational. Affect is normal.    Cutaneous Exam:  Physical Exam  Acne exam: exam of face, neck, chest, and back was performed. Facial covering was removed during examination. Diagnoses/exam findings/medical history pertinent to this visit are listed below:    Assessment:   Diagnosis Orders   1. Acne vulgaris  benzoyl peroxide 5 % external liquid    clindamycin (CLEOCIN T) 1 % lotion    tretinoin (RETIN-A) 0.05 % cream      2.  Post-acne scarring  External Referral To Dermatology           Plan:  Acne vulgaris  - chronic illness with progression and/or exacerbation  - s/p isotretinoin, with new recurrence on back, mild  - restart bpo, clinda for back and face  - continue tretinoin for face  - benzoyl peroxide 5% wash daily (or OTC alternative, options listed in AVS)  - clindamycin 1% lotion daily   - continue tretinoin    Post acne scarring  - [facility] for cosmetic laser treatment, microneedling, or chemical peel    Boggy area of scalp, possible lipoma vs. Other  - ultrasound revealed no discrete mass, unchanged since LV  reassurance and education     RTC 4 months (can transfer care to [facility] if preferred)    Future Appointments   Date Time Provider Romulo Mendez   5/4/2023  9:15 AM Maxx Rascon MD  [facility] There are no Patient Instructions on file for this visit. Jovanni Gonzalez, personally scribed the services dictated to me by Dr. Paulette Richardson in this documentation. I, Dr. Paulette Richardson, personally performed the services described in this documentation, as scribed by Jose Krause in my presence, and it is both accurate and complete.     Electronically signed by Luba Almaraz MD on 1/4/2023 at 9:47 AM

## 2023-01-04 ENCOUNTER — OFFICE VISIT (OUTPATIENT)
Dept: DERMATOLOGY | Age: 23
End: 2023-01-04
Payer: COMMERCIAL

## 2023-01-04 VITALS
OXYGEN SATURATION: 95 % | TEMPERATURE: 97.6 F | WEIGHT: 195 LBS | HEIGHT: 77 IN | SYSTOLIC BLOOD PRESSURE: 113 MMHG | BODY MASS INDEX: 23.02 KG/M2 | DIASTOLIC BLOOD PRESSURE: 74 MMHG | HEART RATE: 73 BPM

## 2023-01-04 DIAGNOSIS — L70.0 ACNE VULGARIS: Primary | ICD-10-CM

## 2023-01-04 DIAGNOSIS — L73.0 POST-ACNE SCARRING: ICD-10-CM

## 2023-01-04 PROCEDURE — 99214 OFFICE O/P EST MOD 30 MIN: CPT | Performed by: DERMATOLOGY

## 2023-01-04 RX ORDER — TRETINOIN 0.5 MG/G
CREAM TOPICAL
Qty: 45 G | Refills: 11 | Status: SHIPPED | OUTPATIENT
Start: 2023-01-04

## 2023-01-04 RX ORDER — CLINDAMYCIN PHOSPHATE 10 UG/ML
LOTION TOPICAL
Qty: 60 ML | Refills: 3 | Status: SHIPPED | OUTPATIENT
Start: 2023-01-04

## 2023-05-04 ENCOUNTER — OFFICE VISIT (OUTPATIENT)
Dept: DERMATOLOGY | Age: 23
End: 2023-05-04
Payer: COMMERCIAL

## 2023-05-04 VITALS
HEIGHT: 77 IN | OXYGEN SATURATION: 97 % | BODY MASS INDEX: 22.86 KG/M2 | WEIGHT: 193.6 LBS | HEART RATE: 71 BPM | DIASTOLIC BLOOD PRESSURE: 74 MMHG | TEMPERATURE: 97.7 F | SYSTOLIC BLOOD PRESSURE: 121 MMHG

## 2023-05-04 DIAGNOSIS — L73.0 POST-ACNE SCARRING: ICD-10-CM

## 2023-05-04 DIAGNOSIS — L70.0 ACNE VULGARIS: Primary | ICD-10-CM

## 2023-05-04 PROCEDURE — 99214 OFFICE O/P EST MOD 30 MIN: CPT | Performed by: DERMATOLOGY

## 2023-05-04 RX ORDER — TRETINOIN 0.5 MG/G
CREAM TOPICAL
Qty: 45 G | Refills: 11 | Status: SHIPPED | OUTPATIENT
Start: 2023-05-04

## 2023-05-04 RX ORDER — CLINDAMYCIN PHOSPHATE 10 UG/ML
LOTION TOPICAL
Qty: 60 ML | Refills: 3 | Status: SHIPPED | OUTPATIENT
Start: 2023-05-04

## 2023-05-04 NOTE — PATIENT INSTRUCTIONS
Continue topical regimen.  Refills sent and [facility] placed to [facility]      [address] [place], [address]  Mon-Thurs: 8a-5p  Fridays: 8a-4p  [address] [place], [place], [address]  Mon-Thurs: 8a-5p

## 2023-11-13 ENCOUNTER — TELEMEDICINE (OUTPATIENT)
Dept: DERMATOLOGY | Age: 23
End: 2023-11-13
Payer: COMMERCIAL

## 2023-11-13 DIAGNOSIS — L70.0 ACNE VULGARIS: ICD-10-CM

## 2023-11-13 PROCEDURE — 99214 OFFICE O/P EST MOD 30 MIN: CPT | Performed by: DERMATOLOGY

## 2023-11-13 RX ORDER — CLINDAMYCIN PHOSPHATE 10 UG/ML
LOTION TOPICAL
Qty: 60 ML | Refills: 3 | Status: SHIPPED | OUTPATIENT
Start: 2023-11-13

## 2023-11-13 RX ORDER — TRETINOIN 0.5 MG/G
CREAM TOPICAL
Qty: 45 G | Refills: 5 | Status: SHIPPED | OUTPATIENT
Start: 2023-11-13

## 2023-11-13 NOTE — PROGRESS NOTES
TELEHEALTH EVALUATION -- Audio/Visual    Dermatology Patient Note  [address]  [address] [place] [address]  Dept: [phone]  Dept Fax: [phone]      VISITDATE: 11/13/2023   REFERRING PROVIDER: No ref. provider found      Brian Hutchinson is a 26 y.o. male  who presents today in the office for:    No chief complaint on file. HISTORY OF PRESENT ILLNESS:  Acne f/u, has previously completed course of isotretinoin  He ran out of topicals but just restarted and it has started to improve  Using BPO wash, clinda lotion, tretinoin  Having surgery on his knee in December    CURRENT MEDICATIONS:   Current Outpatient Medications   Medication Sig Dispense Refill    benzoyl peroxide 5 % external liquid Wash affected areas once daily 227 g 3    clindamycin (CLEOCIN T) 1 % lotion Apply to affected areas daily 60 mL 3    tretinoin (RETIN-A) 0.05 % cream Apply a pea sized amount to face, chest and back nightly 45 g 5    albuterol sulfate  (90 Base) MCG/ACT inhaler INHALE 2 PUFFS BY MOUTH EVERY 4 HOURS AS NEEDED FOR SHORTNESS OF BREATH 8.5 g 1    ketoconazole (NIZORAL) 2 % shampoo Shampoo hair 3 times per week. Apply shampoo to wet hair, allow to stand for a few minutes, then rinse well. Avoid contact with eyes (Patient not taking: Reported on 1/4/2023) 1 Bottle 2     No current facility-administered medications for this visit. ALLERGIES:   No Known Allergies    SOCIAL HISTORY:  Social History     Tobacco Use    Smoking status: Never     Passive exposure: Never    Smokeless tobacco: Never   Substance Use Topics    Alcohol use: Never       Pertinent ROS:  Review of Systems  Skin: Denies any new changing, growing or bleeding lesions or rashes except as described in the HPI   Constitutional: Denies fevers, chills, and malaise.     PHYSICAL EXAM:     Due to this being a TeleHealth encounter, evaluation of the

## (undated) DEVICE — CHLORAPREP 26ML ORANGE

## (undated) DEVICE — GARMENT,MEDLINE,DVT,INT,CALF,MED, GEN2: Brand: MEDLINE

## (undated) DEVICE — BANDAGE,GAUZE,BULKEE II,4.5"X4.1YD,STRL: Brand: MEDLINE

## (undated) DEVICE — PAD,ABDOMINAL,5"X9",ST,LF,25/BX: Brand: MEDLINE INDUSTRIES, INC.

## (undated) DEVICE — E-Z CLEAN, NON-STICK, PTFE COATED, ELECTROSURGICAL BLADE ELECTRODE, MODIFIED EXTENDED INSULATION, 2.5 INCH (6.35 CM): Brand: MEGADYNE

## (undated) DEVICE — TOWEL,OR,DSP,ST,NATURAL,DLX,4/PK,20PK/CS: Brand: MEDLINE

## (undated) DEVICE — GLOVE ORANGE PI 7 1/2   MSG9075

## (undated) DEVICE — SUTURE VCRL SZ 2-0 L27IN ABSRB VLT RB-1 L17MM 1/2 CIR J306H

## (undated) DEVICE — GAUZE,SPONGE,FLUFF,6"X6.75",STRL,5/TRAY: Brand: MEDLINE

## (undated) DEVICE — E-Z CLEAN, NON-STICK, PTFE COATED, ELECTROSURGICAL NEEDLE ELECTRODE, MODIFIED EXTENDED INSULATION, 2.75 INCH (7 CM): Brand: MEGADYNE

## (undated) DEVICE — PACK PROCEDURE SURG GEN MIN SVMMC

## (undated) DEVICE — DRAPE,UTILTY,TAPE,15X26, 4EA/PK: Brand: MEDLINE

## (undated) DEVICE — GOWN,AURORA,NONRNF,XL,30/CS: Brand: MEDLINE

## (undated) DEVICE — GLOVE ORANGE PI 7   MSG9070

## (undated) DEVICE — 3M™ IOBAN™ 2 ANTIMICROBIAL INCISE DRAPE 6650EZ: Brand: IOBAN™ 2

## (undated) DEVICE — GOWN,AURORA,NONREINFORCED,LARGE: Brand: MEDLINE

## (undated) DEVICE — COVER LT HNDL BLU PLAS

## (undated) DEVICE — 3M™ STERI-STRIP™ COMPOUND BENZOIN TINCTURE 40 BAGS/CARTON 4 CARTONS/CASE C1544: Brand: 3M™ STERI-STRIP™

## (undated) DEVICE — SUTURE VCRL SZ 3-0 L27IN ABSRB UD L17MM RB-1 1/2 CIR J215H

## (undated) DEVICE — BLADE CLIPPER GEN PURP NS

## (undated) DEVICE — GLOVE SURG SZ 65 THK91MIL LTX FREE SYN POLYISOPRENE